# Patient Record
Sex: FEMALE | Race: WHITE | Employment: UNEMPLOYED | URBAN - METROPOLITAN AREA
[De-identification: names, ages, dates, MRNs, and addresses within clinical notes are randomized per-mention and may not be internally consistent; named-entity substitution may affect disease eponyms.]

---

## 2019-04-19 PROBLEM — F41.9 ANXIETY: Status: ACTIVE | Noted: 2019-04-19

## 2019-04-19 PROBLEM — M79.7 FIBROMYALGIA: Status: ACTIVE | Noted: 2019-04-19

## 2019-04-19 PROBLEM — R19.00 PELVIC MASS: Status: ACTIVE | Noted: 2019-04-19

## 2019-04-19 PROBLEM — M16.0 OSTEOARTHRITIS OF BOTH HIPS: Status: ACTIVE | Noted: 2019-04-19

## 2019-05-02 ENCOUNTER — ANESTHESIA (OUTPATIENT)
Dept: SURGERY | Facility: CLINIC | Age: 39
End: 2019-05-02
Payer: COMMERCIAL

## 2019-05-02 ENCOUNTER — ANESTHESIA EVENT (OUTPATIENT)
Dept: SURGERY | Facility: CLINIC | Age: 39
End: 2019-05-02
Payer: COMMERCIAL

## 2019-05-02 ENCOUNTER — HOSPITAL ENCOUNTER (OUTPATIENT)
Facility: CLINIC | Age: 39
Discharge: HOME | End: 2019-05-03
Attending: OBSTETRICS & GYNECOLOGY | Admitting: OBSTETRICS & GYNECOLOGY
Payer: COMMERCIAL

## 2019-05-02 DIAGNOSIS — R19.00 PELVIC MASS: Primary | ICD-10-CM

## 2019-05-02 PROCEDURE — 36000087 ZZH SURGERY LEVEL 8 EA 15 ADDTL MIN: Performed by: OBSTETRICS & GYNECOLOGY

## 2019-05-02 PROCEDURE — 88331 PATH CONSLTJ SURG 1 BLK 1SPC: CPT | Performed by: OBSTETRICS & GYNECOLOGY

## 2019-05-02 PROCEDURE — 25000125 ZZHC RX 250: Performed by: OBSTETRICS & GYNECOLOGY

## 2019-05-02 PROCEDURE — 27210794 ZZH OR GENERAL SUPPLY STERILE: Performed by: OBSTETRICS & GYNECOLOGY

## 2019-05-02 PROCEDURE — 37000008 ZZH ANESTHESIA TECHNICAL FEE, 1ST 30 MIN: Performed by: OBSTETRICS & GYNECOLOGY

## 2019-05-02 PROCEDURE — 25000128 H RX IP 250 OP 636: Performed by: NURSE ANESTHETIST, CERTIFIED REGISTERED

## 2019-05-02 PROCEDURE — 36000085 ZZH SURGERY LEVEL 8 1ST 30 MIN: Performed by: OBSTETRICS & GYNECOLOGY

## 2019-05-02 PROCEDURE — 37000009 ZZH ANESTHESIA TECHNICAL FEE, EACH ADDTL 15 MIN: Performed by: OBSTETRICS & GYNECOLOGY

## 2019-05-02 PROCEDURE — 88307 TISSUE EXAM BY PATHOLOGIST: CPT | Performed by: OBSTETRICS & GYNECOLOGY

## 2019-05-02 PROCEDURE — 71000013 ZZH RECOVERY PHASE 1 LEVEL 1 EA ADDTL HR: Performed by: OBSTETRICS & GYNECOLOGY

## 2019-05-02 PROCEDURE — 25000132 ZZH RX MED GY IP 250 OP 250 PS 637: Performed by: ANESTHESIOLOGY

## 2019-05-02 PROCEDURE — 25000132 ZZH RX MED GY IP 250 OP 250 PS 637: Performed by: NURSE PRACTITIONER

## 2019-05-02 PROCEDURE — 40000170 ZZH STATISTIC PRE-PROCEDURE ASSESSMENT II: Performed by: OBSTETRICS & GYNECOLOGY

## 2019-05-02 PROCEDURE — 25800030 ZZH RX IP 258 OP 636: Performed by: NURSE ANESTHETIST, CERTIFIED REGISTERED

## 2019-05-02 PROCEDURE — 71000012 ZZH RECOVERY PHASE 1 LEVEL 1 FIRST HR: Performed by: OBSTETRICS & GYNECOLOGY

## 2019-05-02 PROCEDURE — 25800025 ZZH RX 258: Performed by: OBSTETRICS & GYNECOLOGY

## 2019-05-02 PROCEDURE — 25000125 ZZHC RX 250: Performed by: NURSE ANESTHETIST, CERTIFIED REGISTERED

## 2019-05-02 PROCEDURE — 25000128 H RX IP 250 OP 636: Performed by: ANESTHESIOLOGY

## 2019-05-02 PROCEDURE — 25000566 ZZH SEVOFLURANE, EA 15 MIN: Performed by: OBSTETRICS & GYNECOLOGY

## 2019-05-02 RX ORDER — PROCHLORPERAZINE MALEATE 10 MG
10 TABLET ORAL EVERY 6 HOURS PRN
Status: DISCONTINUED | OUTPATIENT
Start: 2019-05-02 | End: 2019-05-03 | Stop reason: HOSPADM

## 2019-05-02 RX ORDER — NEOSTIGMINE METHYLSULFATE 1 MG/ML
VIAL (ML) INJECTION PRN
Status: DISCONTINUED | OUTPATIENT
Start: 2019-05-02 | End: 2019-05-02

## 2019-05-02 RX ORDER — SODIUM CHLORIDE, SODIUM LACTATE, POTASSIUM CHLORIDE, CALCIUM CHLORIDE 600; 310; 30; 20 MG/100ML; MG/100ML; MG/100ML; MG/100ML
INJECTION, SOLUTION INTRAVENOUS CONTINUOUS
Status: DISCONTINUED | OUTPATIENT
Start: 2019-05-02 | End: 2019-05-02 | Stop reason: HOSPADM

## 2019-05-02 RX ORDER — HYDROMORPHONE HYDROCHLORIDE 1 MG/ML
.3-.5 INJECTION, SOLUTION INTRAMUSCULAR; INTRAVENOUS; SUBCUTANEOUS EVERY 5 MIN PRN
Status: DISCONTINUED | OUTPATIENT
Start: 2019-05-02 | End: 2019-05-02 | Stop reason: HOSPADM

## 2019-05-02 RX ORDER — FENTANYL CITRATE 50 UG/ML
INJECTION, SOLUTION INTRAMUSCULAR; INTRAVENOUS PRN
Status: DISCONTINUED | OUTPATIENT
Start: 2019-05-02 | End: 2019-05-02

## 2019-05-02 RX ORDER — MAGNESIUM HYDROXIDE 1200 MG/15ML
LIQUID ORAL PRN
Status: DISCONTINUED | OUTPATIENT
Start: 2019-05-02 | End: 2019-05-02 | Stop reason: HOSPADM

## 2019-05-02 RX ORDER — ACETAMINOPHEN 500 MG
1000 TABLET ORAL 2 TIMES DAILY PRN
COMMUNITY

## 2019-05-02 RX ORDER — LIDOCAINE 40 MG/G
CREAM TOPICAL
Status: DISCONTINUED | OUTPATIENT
Start: 2019-05-02 | End: 2019-05-03 | Stop reason: HOSPADM

## 2019-05-02 RX ORDER — IBUPROFEN 600 MG/1
600 TABLET, FILM COATED ORAL EVERY 6 HOURS PRN
Qty: 30 TABLET | Refills: 1 | Status: SHIPPED | OUTPATIENT
Start: 2019-05-02

## 2019-05-02 RX ORDER — LABETALOL HYDROCHLORIDE 5 MG/ML
INJECTION, SOLUTION INTRAVENOUS PRN
Status: DISCONTINUED | OUTPATIENT
Start: 2019-05-02 | End: 2019-05-02

## 2019-05-02 RX ORDER — HYDROCODONE BITARTRATE AND ACETAMINOPHEN 5; 325 MG/1; MG/1
1-2 TABLET ORAL EVERY 4 HOURS PRN
Qty: 15 TABLET | Refills: 0 | Status: SHIPPED | OUTPATIENT
Start: 2019-05-02 | End: 2019-05-02

## 2019-05-02 RX ORDER — FENTANYL CITRATE 50 UG/ML
25-50 INJECTION, SOLUTION INTRAMUSCULAR; INTRAVENOUS
Status: DISCONTINUED | OUTPATIENT
Start: 2019-05-02 | End: 2019-05-02 | Stop reason: HOSPADM

## 2019-05-02 RX ORDER — ONDANSETRON 4 MG/1
4 TABLET, ORALLY DISINTEGRATING ORAL EVERY 30 MIN PRN
Status: DISCONTINUED | OUTPATIENT
Start: 2019-05-02 | End: 2019-05-02 | Stop reason: HOSPADM

## 2019-05-02 RX ORDER — ONDANSETRON 2 MG/ML
4 INJECTION INTRAMUSCULAR; INTRAVENOUS EVERY 6 HOURS PRN
Status: DISCONTINUED | OUTPATIENT
Start: 2019-05-02 | End: 2019-05-03 | Stop reason: HOSPADM

## 2019-05-02 RX ORDER — HYDROCODONE BITARTRATE AND ACETAMINOPHEN 5; 325 MG/1; MG/1
1-2 TABLET ORAL EVERY 4 HOURS PRN
Status: DISCONTINUED | OUTPATIENT
Start: 2019-05-02 | End: 2019-05-03 | Stop reason: HOSPADM

## 2019-05-02 RX ORDER — PROPOFOL 10 MG/ML
INJECTION, EMULSION INTRAVENOUS CONTINUOUS PRN
Status: DISCONTINUED | OUTPATIENT
Start: 2019-05-02 | End: 2019-05-02

## 2019-05-02 RX ORDER — HYDROMORPHONE HYDROCHLORIDE 1 MG/ML
0.2 INJECTION, SOLUTION INTRAMUSCULAR; INTRAVENOUS; SUBCUTANEOUS
Status: DISCONTINUED | OUTPATIENT
Start: 2019-05-02 | End: 2019-05-03 | Stop reason: HOSPADM

## 2019-05-02 RX ORDER — ONDANSETRON 2 MG/ML
INJECTION INTRAMUSCULAR; INTRAVENOUS PRN
Status: DISCONTINUED | OUTPATIENT
Start: 2019-05-02 | End: 2019-05-02

## 2019-05-02 RX ORDER — KETOROLAC TROMETHAMINE 30 MG/ML
INJECTION, SOLUTION INTRAMUSCULAR; INTRAVENOUS PRN
Status: DISCONTINUED | OUTPATIENT
Start: 2019-05-02 | End: 2019-05-02

## 2019-05-02 RX ORDER — ALBUTEROL SULFATE 0.83 MG/ML
2.5 SOLUTION RESPIRATORY (INHALATION) EVERY 4 HOURS PRN
Status: DISCONTINUED | OUTPATIENT
Start: 2019-05-02 | End: 2019-05-02 | Stop reason: HOSPADM

## 2019-05-02 RX ORDER — IBUPROFEN 200 MG
800 TABLET ORAL 2 TIMES DAILY PRN
Status: ON HOLD | COMMUNITY
End: 2019-05-02

## 2019-05-02 RX ORDER — ONDANSETRON 2 MG/ML
4 INJECTION INTRAMUSCULAR; INTRAVENOUS EVERY 30 MIN PRN
Status: DISCONTINUED | OUTPATIENT
Start: 2019-05-02 | End: 2019-05-02 | Stop reason: HOSPADM

## 2019-05-02 RX ORDER — GLYCOPYRROLATE 0.2 MG/ML
INJECTION, SOLUTION INTRAMUSCULAR; INTRAVENOUS PRN
Status: DISCONTINUED | OUTPATIENT
Start: 2019-05-02 | End: 2019-05-02

## 2019-05-02 RX ORDER — NALOXONE HYDROCHLORIDE 0.4 MG/ML
.1-.4 INJECTION, SOLUTION INTRAMUSCULAR; INTRAVENOUS; SUBCUTANEOUS
Status: DISCONTINUED | OUTPATIENT
Start: 2019-05-02 | End: 2019-05-03 | Stop reason: HOSPADM

## 2019-05-02 RX ORDER — NALOXONE HYDROCHLORIDE 0.4 MG/ML
.1-.4 INJECTION, SOLUTION INTRAMUSCULAR; INTRAVENOUS; SUBCUTANEOUS
Status: DISCONTINUED | OUTPATIENT
Start: 2019-05-02 | End: 2019-05-02

## 2019-05-02 RX ORDER — LIDOCAINE HYDROCHLORIDE 20 MG/ML
INJECTION, SOLUTION INFILTRATION; PERINEURAL PRN
Status: DISCONTINUED | OUTPATIENT
Start: 2019-05-02 | End: 2019-05-02

## 2019-05-02 RX ORDER — ONDANSETRON 4 MG/1
4 TABLET, ORALLY DISINTEGRATING ORAL EVERY 6 HOURS PRN
Status: DISCONTINUED | OUTPATIENT
Start: 2019-05-02 | End: 2019-05-03 | Stop reason: HOSPADM

## 2019-05-02 RX ORDER — ONDANSETRON 4 MG/1
4 TABLET, ORALLY DISINTEGRATING ORAL
Status: DISCONTINUED | OUTPATIENT
Start: 2019-05-02 | End: 2019-05-02

## 2019-05-02 RX ORDER — HYDROXYZINE HYDROCHLORIDE 25 MG/1
25 TABLET, FILM COATED ORAL ONCE
Status: COMPLETED | OUTPATIENT
Start: 2019-05-02 | End: 2019-05-02

## 2019-05-02 RX ORDER — SODIUM CHLORIDE, SODIUM LACTATE, POTASSIUM CHLORIDE, CALCIUM CHLORIDE 600; 310; 30; 20 MG/100ML; MG/100ML; MG/100ML; MG/100ML
INJECTION, SOLUTION INTRAVENOUS CONTINUOUS PRN
Status: DISCONTINUED | OUTPATIENT
Start: 2019-05-02 | End: 2019-05-02

## 2019-05-02 RX ORDER — DEXAMETHASONE SODIUM PHOSPHATE 4 MG/ML
INJECTION, SOLUTION INTRA-ARTICULAR; INTRALESIONAL; INTRAMUSCULAR; INTRAVENOUS; SOFT TISSUE PRN
Status: DISCONTINUED | OUTPATIENT
Start: 2019-05-02 | End: 2019-05-02

## 2019-05-02 RX ORDER — HYDROCODONE BITARTRATE AND ACETAMINOPHEN 5; 325 MG/1; MG/1
1-2 TABLET ORAL EVERY 4 HOURS PRN
Qty: 15 TABLET | Refills: 0 | Status: SHIPPED | OUTPATIENT
Start: 2019-05-02

## 2019-05-02 RX ORDER — HYDRALAZINE HYDROCHLORIDE 20 MG/ML
2.5-5 INJECTION INTRAMUSCULAR; INTRAVENOUS EVERY 10 MIN PRN
Status: DISCONTINUED | OUTPATIENT
Start: 2019-05-02 | End: 2019-05-02 | Stop reason: HOSPADM

## 2019-05-02 RX ORDER — HYDROCODONE BITARTRATE AND ACETAMINOPHEN 5; 325 MG/1; MG/1
1 TABLET ORAL
Status: DISCONTINUED | OUTPATIENT
Start: 2019-05-02 | End: 2019-05-02

## 2019-05-02 RX ORDER — PROPOFOL 10 MG/ML
INJECTION, EMULSION INTRAVENOUS PRN
Status: DISCONTINUED | OUTPATIENT
Start: 2019-05-02 | End: 2019-05-02

## 2019-05-02 RX ORDER — ACETAMINOPHEN 500 MG
1000 TABLET ORAL ONCE
Status: COMPLETED | OUTPATIENT
Start: 2019-05-02 | End: 2019-05-02

## 2019-05-02 RX ORDER — BUPIVACAINE HYDROCHLORIDE AND EPINEPHRINE 5; 5 MG/ML; UG/ML
INJECTION, SOLUTION PERINEURAL PRN
Status: DISCONTINUED | OUTPATIENT
Start: 2019-05-02 | End: 2019-05-02 | Stop reason: HOSPADM

## 2019-05-02 RX ORDER — SENNOSIDES A AND B 8.6 MG/1
1-3 TABLET, FILM COATED ORAL 2 TIMES DAILY PRN
Qty: 30 TABLET | Refills: 0 | Status: SHIPPED | OUTPATIENT
Start: 2019-05-02 | End: 2019-05-02

## 2019-05-02 RX ADMIN — GLYCOPYRROLATE 0.5 MG: 0.2 INJECTION, SOLUTION INTRAMUSCULAR; INTRAVENOUS at 08:27

## 2019-05-02 RX ADMIN — ACETAMINOPHEN 1000 MG: 500 TABLET, FILM COATED ORAL at 06:44

## 2019-05-02 RX ADMIN — HYDROCODONE BITARTRATE AND ACETAMINOPHEN 1 TABLET: 5; 325 TABLET ORAL at 10:59

## 2019-05-02 RX ADMIN — HYDROCODONE BITARTRATE AND ACETAMINOPHEN 1 TABLET: 5; 325 TABLET ORAL at 16:53

## 2019-05-02 RX ADMIN — FENTANYL CITRATE 100 MCG: 50 INJECTION, SOLUTION INTRAMUSCULAR; INTRAVENOUS at 07:29

## 2019-05-02 RX ADMIN — PROPOFOL 30 MCG/KG/MIN: 10 INJECTION, EMULSION INTRAVENOUS at 07:42

## 2019-05-02 RX ADMIN — Medication 0.5 MG: at 09:18

## 2019-05-02 RX ADMIN — NEOSTIGMINE METHYLSULFATE 2.5 MG: 1 INJECTION, SOLUTION INTRAVENOUS at 08:27

## 2019-05-02 RX ADMIN — MIDAZOLAM 2 MG: 1 INJECTION INTRAMUSCULAR; INTRAVENOUS at 07:25

## 2019-05-02 RX ADMIN — NEOSTIGMINE METHYLSULFATE 2.5 MG: 1 INJECTION, SOLUTION INTRAVENOUS at 08:26

## 2019-05-02 RX ADMIN — HYDROCODONE BITARTRATE AND ACETAMINOPHEN 2 TABLET: 5; 325 TABLET ORAL at 21:16

## 2019-05-02 RX ADMIN — LABETALOL HYDROCHLORIDE 20 MG: 5 INJECTION INTRAVENOUS at 08:36

## 2019-05-02 RX ADMIN — HYDROMORPHONE HYDROCHLORIDE 0.5 MG: 1 INJECTION, SOLUTION INTRAMUSCULAR; INTRAVENOUS; SUBCUTANEOUS at 07:48

## 2019-05-02 RX ADMIN — HYDROCODONE BITARTRATE AND ACETAMINOPHEN 1 TABLET: 5; 325 TABLET ORAL at 17:30

## 2019-05-02 RX ADMIN — SODIUM CHLORIDE, POTASSIUM CHLORIDE, SODIUM LACTATE AND CALCIUM CHLORIDE: 600; 310; 30; 20 INJECTION, SOLUTION INTRAVENOUS at 07:29

## 2019-05-02 RX ADMIN — ONDANSETRON 4 MG: 2 INJECTION INTRAMUSCULAR; INTRAVENOUS at 08:16

## 2019-05-02 RX ADMIN — HYDROXYZINE HYDROCHLORIDE 25 MG: 25 TABLET ORAL at 06:44

## 2019-05-02 RX ADMIN — DEXAMETHASONE SODIUM PHOSPHATE 4 MG: 4 INJECTION, SOLUTION INTRA-ARTICULAR; INTRALESIONAL; INTRAMUSCULAR; INTRAVENOUS; SOFT TISSUE at 07:40

## 2019-05-02 RX ADMIN — KETOROLAC TROMETHAMINE 30 MG: 30 INJECTION, SOLUTION INTRAMUSCULAR at 08:16

## 2019-05-02 RX ADMIN — FENTANYL CITRATE 25 MCG: 50 INJECTION, SOLUTION INTRAMUSCULAR; INTRAVENOUS at 08:24

## 2019-05-02 RX ADMIN — PROPOFOL 310 MG: 10 INJECTION, EMULSION INTRAVENOUS at 07:33

## 2019-05-02 RX ADMIN — GLYCOPYRROLATE 0.5 MG: 0.2 INJECTION, SOLUTION INTRAMUSCULAR; INTRAVENOUS at 08:26

## 2019-05-02 RX ADMIN — ROCURONIUM BROMIDE 50 MG: 10 INJECTION INTRAVENOUS at 07:33

## 2019-05-02 RX ADMIN — LIDOCAINE HYDROCHLORIDE 100 MG: 20 INJECTION, SOLUTION INFILTRATION; PERINEURAL at 07:33

## 2019-05-02 ASSESSMENT — LIFESTYLE VARIABLES: TOBACCO_USE: 1

## 2019-05-02 ASSESSMENT — MIFFLIN-ST. JEOR: SCORE: 1668.34

## 2019-05-02 ASSESSMENT — ENCOUNTER SYMPTOMS
DYSRHYTHMIAS: 0
SEIZURES: 0

## 2019-05-02 NOTE — PLAN OF CARE
Stable post op. Pain RLQ lap site controlled with 1 norco, patient states other sites not painful. Tolerated FL diet, no nausea, will advance to low fiber. Ambulated in simms wit SBA, up in chair. Voided. Plan home tomorrow.

## 2019-05-02 NOTE — OR NURSING
Capnography monitor working well and sent along with patient. Report called to [facility] on station [id number]. Patient transferred in stable condition with cane and 2 bags of personal items and one duffle bag from home. Patient rates pain at a 2 and denies nausea.

## 2019-05-02 NOTE — ANESTHESIA POSTPROCEDURE EVALUATION
Patient: Esme Pattersonscarpratima    Procedure(s):  ROBOTIC ASSISTED LAPARASCOPIC EXCISION OF RIGHT OVARIAN CYST  DAVINCI XI RIGHT SALPINGO-OOPHORECTOMY    Diagnosis:RIGHT OVARIAN CYST  Diagnosis Additional Information: No value filed.    Anesthesia Type:  General, ETT    Note:  Anesthesia Post Evaluation    Patient location during evaluation: PACU  Patient participation: Able to fully participate in evaluation  Level of consciousness: awake and alert  Pain management: adequate  Airway patency: patent  Cardiovascular status: acceptable, hemodynamically stable and blood pressure returned to baseline  Respiratory status: acceptable and nasal cannula  Hydration status: acceptable  PONV: none     Anesthetic complications: None          Last vitals:  Vitals:    05/02/19 1000 05/02/19 1043 05/02/19 1151   BP: 131/78 113/70 120/70   Pulse: 76 74 75   Resp: 16 20 16   Temp: 36.7  C (98.1  F) 36.4  C (97.6  F) 35.7  C (96.3  F)   SpO2: 97% 95% 95%         Electronically Signed By: Marilee Castillo MD  May 2, 2019  2:39 PM

## 2019-05-02 NOTE — ANESTHESIA PREPROCEDURE EVALUATION
Anesthesia Pre-Procedure Evaluation    Patient: Esme Silverman   MRN: [id number] : 1980          Preoperative Diagnosis: RIGHT OVARIAN CYST    Procedure(s):  ROBOTIC ASSISTED LAPARASCOPIC EXCISION OF RIGHT OVARIAN CYST  POSSIBLE DAVINCI XI RIGHT SALPINGO-OOPHORECTOMY    No past medical history on file.  No past surgical history on file.  Allergies   Allergen Reactions     Contrast Dye Anaphylaxis and Hives     Prior to Admission medications    Medication Sig Start Date End Date Taking? Authorizing Provider   acetaminophen (TYLENOL) 500 MG tablet Take 1,000 mg by mouth 2 times daily as needed for mild pain   Yes Reported, Patient   ibuprofen (ADVIL/MOTRIN) 200 MG tablet Take 800 mg by mouth 2 times daily as needed for mild pain   Yes Reported, Patient     RECENT LABS: K 4.1, Cr 0.7, hgb 13.3, plt 417  ECG: NSR, no st or twave abnormalities.     Anesthesia Evaluation     . Pt has had prior anesthetic. Type: General    No history of anesthetic complications          ROS/MED HX    ENT/Pulmonary:     (+)tobacco use, Past use , . .   (-) asthma and sleep apnea   Neurologic:      (-) seizures, Neuropathy and migraines   Cardiovascular:        (-) hypertension, ALY, arrhythmias, valvular problems/murmurs, dyslipidemia and stent   METS/Exercise Tolerance:  >4 METS   Hematologic:        (-) history of blood clots, anemia and other hematologic disorder   Musculoskeletal:   (+) arthritis,  -       GI/Hepatic:        (-) GERD and liver disease   Renal/Genitourinary:      (-) renal disease and nephrolithiasis   Endo:     (+) Obesity, .   (-) Type I DM, Type II DM and thyroid disease   Psychiatric:        (-) psychiatric history   Infectious Disease:        (-) Recent Fever   Malignancy:         Other: Comment: Fibromyalgia  Cannabis use                         Physical Exam  Normal systems: cardiovascular, pulmonary and dental    Airway   Mallampati: II  TM distance: >3 FB  Neck ROM: full    Dental     Cardiovascular    Rhythm and rate: regular and normal      Pulmonary    breath sounds clear to auscultation            No results found for: WBC, HGB, HCT, PLT, CRP, SED, NA, POTASSIUM, CHLORIDE, CO2, BUN, CR, GLC, KIA, PHOS, MAG, ALBUMIN, PROTTOTAL, ALT, AST, GGT, ALKPHOS, BILITOTAL, BILIDIRECT, LIPASE, AMYLASE, MARQUITA, PTT, INR, FIBR, TSH, T4, T3, HCG, HCGS, CKTOTAL, CKMB, TROPN    Preop Vitals  BP Readings from Last 3 Encounters:   No data found for BP    Pulse Readings from Last 3 Encounters:   No data found for Pulse      Resp Readings from Last 3 Encounters:   No data found for Resp    SpO2 Readings from Last 3 Encounters:   No data found for SpO2      Temp Readings from Last 1 Encounters:   No data found for Temp    Ht Readings from Last 1 Encounters:   No data found for Ht      Wt Readings from Last 1 Encounters:   No data found for Wt    There is no height or weight on file to calculate BMI.       Anesthesia Plan      History & Physical Review      ASA Status:  2 .    NPO Status:  > 8 hours    Plan for General and ETT with Propofol induction. Maintenance will be Balanced.    PONV prophylaxis:  Ondansetron (or other 5HT-3) and Dexamethasone or Solumedrol  Additional equipment: Videolaryngoscope Propofol infusion  Toradol  Hydromorphone      Postoperative Care  Postoperative pain management:  Multi-modal analgesia.      Consents  Anesthetic plan, risks, benefits and alternatives discussed with:  Patient..                 Marilee Castillo MD

## 2019-05-02 NOTE — PROGRESS NOTES
Admission medication history interview status for the 5/2/2019  admission is complete. See EPIC admission navigator for prior to admission medications     Medication history source reliability:Good    Medication history interview source(s):Patient    Medication history resources (including written lists, pill bottles, clinic record):None    [facility]    Additional medication history information not noted on PTA med list :None    Time spent in this activity: 50 minutes    Prior to Admission medications    Medication Sig Last Dose Taking? Auth Provider   acetaminophen (TYLENOL) 500 MG tablet Take 1,000 mg by mouth 2 times daily as needed for mild pain 4/18/2019 at PRN Yes Reported, Patient   ibuprofen (ADVIL/MOTRIN) 200 MG tablet Take 800 mg by mouth 2 times daily as needed for mild pain 4/17/2019 at PRN Yes Reported, Patient

## 2019-05-02 NOTE — PROCEDURES
[facility] Brief Operative Note    Pre-operative diagnosis: RIGHT OVARIAN CYST   Post-operative diagnosis Benign cysts of right ovary    Procedure: Procedure(s):  ROBOTIC ASSISTED LAPARASCOPIC EXCISION OF RIGHT OVARIAN CYST  DEVAN SCHWARTZ RIGHT SALPINGO-OOPHORECTOMY   Surgeon: DEYSI Colin CNP   Assistants(s):    Estimated blood loss: Less than 10 ml    Specimens: RSO   Findings: Benign right ovarian corpeus luteum cyst

## 2019-05-02 NOTE — OP NOTE
Procedure Date: 05/02/2019      PREOPERATIVE DIAGNOSIS:  Pelvic mass.      POSTOPERATIVE DIAGNOSIS:  Benign corpus luteal cyst.      SURGEON:  ANIRUDH Montemayor MD      ASSISTANT:  Lucy Mendoza NP      ANESTHESIA:  General endotracheal anesthesia.      PROCEDURE:  Robotic-assisted laparoscopic right salpingo-oophorectomy.        INDICATIONS FOR THE PROCEDURE:  The patient is a 39-year-old female who in 2000 underwent a laparotomy and ovarian cystectomy x 2 for exceptionally large ovarian cysts.  One weighed 10 pounds and one weighed 7 pounds, respectively, based on the patient's recollection.  In 2010, she underwent a hysterectomy for an enlarged uterus and left salpingo-oophorectomy at the same time.  She had a postoperative wound infection.  Recently, a pelvic ultrasound was obtained, 07/2018, and revealed a right-sided pelvic fluid collection, 10.2 x 5.6 x 6.3.  This continued to be present on subsequent ultrasounds, 09/20/2018 and 01/31/2019.  She was then referred to me because of pelvic pain and her prior history.  After discussions with Esme, we elected to proceed with excision of the pelvic mass, even if that included right salpingo-oophorectomy.      FINDINGS:  The patient had a large right cystic mass that looked benign.  The capsule was intact.  It did rupture intraoperatively, because of dense adherence to the pelvic peritoneum.  It was densely adherent to the ureter as well.      PROCEDURE IN DETAIL:  The patient was taken to the operating room.  Pneumatic compression stockings had been placed on her lower extremities.  She was placed in a supine position on a pink pad on the operating room table.  General endotracheal anesthesia was administered in the usual fashion.  Once intubated, she was repositioned in low lithotomy position using well-padded Yellofin stirrups.  Her arms were padded and held at her side with draw sheets.  Her hands were equally protected.  Shoulder braces were applied to her  shoulders and a Cantu was placed above her forehead.  She was prepped and draped in the usual sterile fashion.  A timeout was conducted and everyone agreed upon the procedure.  I had marked 4 supraumbilical incision sites above the umbilicus.  I started by infiltrating the one that was 8 cm left to the central port site with 0.5% Marcaine with epinephrine.  A small nick was made in the skin with a #15 scalpel blade.  A Veress needle was easily introduced into the peritoneal cavity.  Opening pressure was 5 mmHg.  The abdomen was insufflated with carbon dioxide to create a diffuse pneumoperitoneum.  Pressure limits were set and maintained at or below 15 mmHg throughout the case.  With establishment of pneumoperitoneum, the Veress needle was removed and replaced with an 8 mm da Devonte trocar.  The camera was then introduced confirming intraperitoneal position and showing no anterior abdominal wall adhesions.  Under direct visualization, 4 additional port sites were placed.  An 8 mm da Devonte port was placed in the midline at 21 cm above the symphysis pubis in the midline.  Another 8 mm da Devonte port was placed 8 cm to the right of that camera port and then a fourth da Devonte robotic port was placed 16 cm to the left of the camera port in the upper abdomen.  She was then placed in steep Trendelenburg at 28 degrees with gravitational displacement of her bowel into the upper abdomen.  An 8 mm AirSeal port was placed above the right anterior superior iliac spine.  Evaluating the pelvis, she was found to have a large 10 cm right ovarian mass.  The capsule was intact at that point.  It was densely adherent to the pelvic peritoneum laterally.  The robot was docked in the usual fashion.  Monopolar scissors were placed in the right upper robotic arm, a Maryland bipolar in the medial left upper robotic arm, and a ProGrasp in the lateral left robotic arm.  The posterior broad ligament lateral to the ovarian vessels was opened up  with the monopolar scissors.  We isolated the ovarian vessels and cauterized them with the Maryland bipolar.  They were divided with the monopolar scissors.  I then dissected out the ureter from the lateral aspect of this mass along its pelvic course.  I mobilized the mass and in doing so, we ruptured a locule of fluid.  Approximately 400-500 mL of fluid was drained.  We then elevated the rest of the mass off of the pelvic peritoneum with a combination of blunt dissection and the monopolar scissors.  Once it was completely freed up, we removed the 8 mm AirSeal and placed a 10 mm Sims EndoCatch bag through the port site.  The mass was laid within it and the bag was exteriorized through the accessory port site and removed in that fashion.  The port was then replaced and we irrigated the pelvis.  We then closed the 8 mm fascia with a fascial closure device using 0 Vicryl suture.  The robotic instruments were removed.  The robot was undocked.  The pneumoperitoneum was allowed to dissipate, and the trocars were removed intact.  Incisions were closed with 4-0 Monocryl in an interrupted fashion to reapproximate the subcutaneous tissue and 4-0 Monocryl in a subcuticular fashion to reapproximate the skin.  Mastisol was placed around the incisions.  Steri-Strips laid over the incisions.  Her anesthesia was reversed.  She was extubated and taken to recovery room in stable condition.        Estimated blood loss was less than 5 mL.  The pathology results on the frozen section were consistent with a benign corpus luteal cyst.      Lucy Mendoza was my primary assist for the case.  She helped with all aspects of the case, including trocar placement, docking of the robot, placement of the robotic instruments.  She assisted through the accessory port site and was instrumental in specimen retrieval.  She helped with undocking the robot and port site closure.         HENRIETTA MATA MD             D: 05/02/2019   T: 05/02/2019    MT: CYNDI      Name:     PAULO PAGE   MRN:      [id number]        Account:        [id number]   :      1980           Procedure Date: 2019      Document: [id number]       cc: Copy for Provider        Alma Greer MD

## 2019-05-02 NOTE — ANESTHESIA CARE TRANSFER NOTE
Patient: Esme Silverman    Procedure(s):  ROBOTIC ASSISTED LAPARASCOPIC EXCISION OF RIGHT OVARIAN CYST  DAVINCI XI RIGHT SALPINGO-OOPHORECTOMY    Diagnosis: RIGHT OVARIAN CYST  Diagnosis Additional Information: No value filed.    Anesthesia Type:   General, ETT     Note:  Airway :Face Mask  Patient transferred to:PACU  Comments: Neuromuscular blockade reversed after TOF 4/4, spontaneous respirations, adequate tidal volumes, followed commands to voice, oropharynx suctioned with soft flexible catheter, extubated atraumatically, airway patent after extubation.  Oxygen via facemask at 10 liters per minute to PACU. Oxygen tubing connected to wall O2 in PACU, SpO2, NiBP, and EKG monitors and alarms on and functioning, Ariane Hugger warmer connected to patient gown, report on patient's clinical status given to PACU RN, RN questions answered. Handoff Report: Identifed the Patient, Identified the Reponsible Provider, Reviewed the pertinent medical history, Discussed the surgical course, Reviewed Intra-OP anesthesia mangement and issues during anesthesia, Set expectations for post-procedure period and Allowed opportunity for questions and acknowledgement of understanding      Vitals: (Last set prior to Anesthesia Care Transfer)    CRNA VITALS  5/2/2019 0809 - 5/2/2019 0847      5/2/2019             NIBP:  149/91  (Abnormal)     Pulse:  97    NIBP Mean:  109    SpO2:  100 %    Resp Rate (set):  10                Electronically Signed By: DEYSI Landers CRNA  May 2, 2019  8:47 AM

## 2019-05-03 VITALS
HEART RATE: 93 BPM | SYSTOLIC BLOOD PRESSURE: 137 MMHG | HEIGHT: 64 IN | WEIGHT: 223.4 LBS | OXYGEN SATURATION: 96 % | BODY MASS INDEX: 38.14 KG/M2 | TEMPERATURE: 96.6 F | RESPIRATION RATE: 16 BRPM | DIASTOLIC BLOOD PRESSURE: 77 MMHG

## 2019-05-03 LAB — COPATH REPORT: NORMAL

## 2019-05-03 PROCEDURE — 25000132 ZZH RX MED GY IP 250 OP 250 PS 637: Performed by: NURSE PRACTITIONER

## 2019-05-03 RX ORDER — ESTRADIOL 1 MG/1
1 TABLET ORAL DAILY
Status: DISCONTINUED | OUTPATIENT
Start: 2019-05-03 | End: 2019-05-03 | Stop reason: HOSPADM

## 2019-05-03 RX ORDER — ESTRADIOL 1 MG/1
1 TABLET ORAL DAILY
Qty: 90 TABLET | Refills: 3 | Status: SHIPPED | OUTPATIENT
Start: 2019-05-03

## 2019-05-03 RX ADMIN — HYDROCODONE BITARTRATE AND ACETAMINOPHEN 2 TABLET: 5; 325 TABLET ORAL at 06:05

## 2019-05-03 RX ADMIN — HYDROCODONE BITARTRATE AND ACETAMINOPHEN 2 TABLET: 5; 325 TABLET ORAL at 01:19

## 2019-05-03 NOTE — PROGRESS NOTES
Doing well.  Incision sites fine.  OK to discharge to home.  Discharge instructions and f/u discussed.    ANIRUDH Montemayor MD  [phone]

## 2019-05-03 NOTE — PLAN OF CARE
Patient is A&Ox4. POD1 of right robotic assisted oophorectomy. C/o abdominal pain, Norco given x2 and heat packs for low back pain. Low fiber diet tolerated. Patient has 4 lap sites across abdomen. L PIV SL. Up independently to bathroom. Possible discharge to home 5/3.

## 2019-05-03 NOTE — DISCHARGE SUMMARY
"HOSPITAL DISCHARGE SUMMARY    Patient Name: Esme Silverman  YOB: 1980 Age: 39 year old  Medical Record Number: [id number]  Primary Physician: No Ref-Primary, Physician  Phone: None  Admission Date: 5/2/2019  Discharge Date: 5/3/19    Esme Silverman  will be discharged from [facility] to Home.    PRINCIPAL DISCHARGE DIAGNOSIS: Pelvid mass    BRIEF HOSPITAL COURSE: This 39 year old female admitted following robotic assisted laparoscopic rightl salpingo-oophorectomy. She tolerated the procedure well. Uneventful post operative course and discharge to home on POD #1 with adequate pain control, tolerating orals, voiding and ambulating.    PROCEDURES PERFORMED DURING HOSPITALIZATION:   Robotic assisted laparoscopic right salpingo-oophorectomy    COMPLICATIONS IN HOSPITAL: None    CONSULTATIONS:  None    PERTINENT FINDINGS/RESULTS AT DISCHARGE:   /70 (BP Location: Right arm)   Pulse 75   Temp 96.1  F (35.6  C) (Oral)   Resp 16   Ht 1.626 m (5' 4\")   Wt 101.3 kg (223 lb 6.4 oz)   SpO2 97%   Breastfeeding? No   BMI 38.35 kg/m      Latest Laboratory Results:  Chem:  CBC RESULTS: No results for input(s): WBC, RBC, HGB, HCT, MCV, MCH, MCHC, RDW, PLT in the last 38068 hours.  Last Basic Metabolic Panel:  No results found for: NA   No results found for: POTASSIUM  No results found for: CHLORIDE  No results found for: KAI  No results found for: CO2  No results found for: BUN  No results found for: CR  No results found for: GLC      IMPORTANT PENDING TEST RESULTS:  Pathology    CONDITION AT DISCHARGE:    Stabilized    DISCHARGE ORDERS  Current Discharge Medication List      START taking these medications    Details   HYDROcodone-acetaminophen (NORCO) 5-325 MG tablet Take 1-2 tablets by mouth every 4 hours as needed for pain maximum 10 tablet(s) per day  Qty: 15 tablet, Refills: 0    Associated Diagnoses: Pelvic mass         CONTINUE these medications which have CHANGED    Details   ibuprofen " (ADVIL/MOTRIN) 600 MG tablet Take 1 tablet (600 mg) by mouth every 6 hours as needed for moderate pain  Qty: 30 tablet, Refills: 1    Associated Diagnoses: Pelvic mass         CONTINUE these medications which have NOT CHANGED    Details   acetaminophen (TYLENOL) 500 MG tablet Take 1,000 mg by mouth 2 times daily as needed for mild pain             DISCHARGE INSTRUCTION.  F/u in Dr. Montemayor's office in 1 and 6 wks post op    FOLLOW-UP: Esme Silverman should see No Ref-Primary, Physician PRN.    Specialty follow-up: as above.     AFTER HOSPITAL RECOMMENDATIONS  As above.      Physician(s) in addition to primary physician who should receive a copy:  No Ref-Primary, Physician       Joya Montemayor

## 2019-05-03 NOTE — PROGRESS NOTES
Filled prescriptions given to patient. Reviewed discharge instructions, questions answered. belongings with patient for discharge. Discharging home, ride by friend, escorted out via w/c by volunteer.

## (undated) DEVICE — SU PDS II 0 CT-2 27" Z334H

## (undated) DEVICE — ESU GROUND PAD UNIVERSAL W/O CORD

## (undated) DEVICE — DAVINCI XI DRAPE COLUMN 470341

## (undated) DEVICE — DAVINCI XI SEAL UNIVERSAL 5-8MM 470361

## (undated) DEVICE — DAVINCI XI MONOPOLAR SCISSORS HOT SHEARS 8MM 470179

## (undated) DEVICE — SOL WATER IRRIG 1000ML BOTTLE 2F7114

## (undated) DEVICE — SOL NACL 0.9% INJ 1000ML BAG 2B1324X

## (undated) DEVICE — ADH LIQUID MASTISOL TOPICAL VIAL 2-3ML 0523-48

## (undated) DEVICE — SOL NACL 0.9% IRRIG 1000ML BOTTLE 2F7124

## (undated) DEVICE — SUCTION IRR STRYKERFLOW II W/TIP 250-070-520

## (undated) DEVICE — ENDO TROCAR CONMED AIRSEAL BLADELESS 08X120MM IAS8-120LP

## (undated) DEVICE — DAVINCI XI OBTURATOR BLADELESS 8MM 470359

## (undated) DEVICE — SU MONOCRYL 4-0 PS-2 18" UND Y496G

## (undated) DEVICE — SUCTION CANISTER MEDIVAC LINER 3000ML W/LID 65651-530

## (undated) DEVICE — POUCH TISSUE RETRIEVAL 15MM 6.00" INTRO TRS190SB2

## (undated) DEVICE — DAVINCI XI GRASPER ENDOWRIST PROGRASP 470093

## (undated) DEVICE — PACK DAVINCI GYN SMA15GDFS1

## (undated) DEVICE — DAVINCI XI ESU FCP BIPOLAR MARYLAND 470172

## (undated) DEVICE — SPONGE LAP 18X18" X8435

## (undated) DEVICE — SPONGE RAY-TEC 4X4" 7317

## (undated) DEVICE — SU VICRYL 0 UR-6 27" J603H

## (undated) DEVICE — GLOVE BIOGEL PI ULTRATOUCH G SZ 6.5 42165

## (undated) DEVICE — DRSG STERI STRIP 1X5" R1548

## (undated) DEVICE — GLOVE PROTEXIS MICRO 6.5  2D73PM65

## (undated) DEVICE — GLOVE PROTEXIS BLUE W/NEU-THERA 6.5  2D73EB65

## (undated) DEVICE — DRAPE CV SPLIT II 147X106" 9158

## (undated) DEVICE — DAVINCI XI DRAPE ARM 470015

## (undated) DEVICE — LINEN TOWEL PACK X5 5464

## (undated) DEVICE — KIT PATIENT POSITIONING PIGAZZI LATEX FREE 40580

## (undated) DEVICE — POUCH TISSUE RETRIEVAL LAP 10MM 2.21" INTRO TRS100SB2

## (undated) DEVICE — TUBING CONMED AIRSEAL SMOKE EVAC INSUFFLATION ASM-EVAC

## (undated) DEVICE — ENDO TROCAR OPTICAL ACCESS KII Z-THRD 15X100MM C0R37

## (undated) DEVICE — DAVINCI HOT SHEARS TIP COVER  400180

## (undated) RX ORDER — FENTANYL CITRATE 50 UG/ML
INJECTION, SOLUTION INTRAMUSCULAR; INTRAVENOUS
Status: DISPENSED
Start: 2019-05-02

## (undated) RX ORDER — ONDANSETRON 2 MG/ML
INJECTION INTRAMUSCULAR; INTRAVENOUS
Status: DISPENSED
Start: 2019-05-02

## (undated) RX ORDER — KETOROLAC TROMETHAMINE 30 MG/ML
INJECTION, SOLUTION INTRAMUSCULAR; INTRAVENOUS
Status: DISPENSED
Start: 2019-05-02

## (undated) RX ORDER — HYDROMORPHONE HYDROCHLORIDE 1 MG/ML
INJECTION, SOLUTION INTRAMUSCULAR; INTRAVENOUS; SUBCUTANEOUS
Status: DISPENSED
Start: 2019-05-02

## (undated) RX ORDER — BUPIVACAINE HYDROCHLORIDE AND EPINEPHRINE 5; 5 MG/ML; UG/ML
INJECTION, SOLUTION EPIDURAL; INTRACAUDAL; PERINEURAL
Status: DISPENSED
Start: 2019-05-02

## (undated) RX ORDER — DEXAMETHASONE SODIUM PHOSPHATE 4 MG/ML
INJECTION, SOLUTION INTRA-ARTICULAR; INTRALESIONAL; INTRAMUSCULAR; INTRAVENOUS; SOFT TISSUE
Status: DISPENSED
Start: 2019-05-02

## (undated) RX ORDER — LIDOCAINE HYDROCHLORIDE 20 MG/ML
INJECTION, SOLUTION EPIDURAL; INFILTRATION; INTRACAUDAL; PERINEURAL
Status: DISPENSED
Start: 2019-05-02

## (undated) RX ORDER — NEOSTIGMINE METHYLSULFATE 1 MG/ML
VIAL (ML) INJECTION
Status: DISPENSED
Start: 2019-05-02

## (undated) RX ORDER — HYDROXYZINE HYDROCHLORIDE 25 MG/1
TABLET, FILM COATED ORAL
Status: DISPENSED
Start: 2019-05-02

## (undated) RX ORDER — GLYCOPYRROLATE 0.2 MG/ML
INJECTION, SOLUTION INTRAMUSCULAR; INTRAVENOUS
Status: DISPENSED
Start: 2019-05-02

## (undated) RX ORDER — PROPOFOL 10 MG/ML
INJECTION, EMULSION INTRAVENOUS
Status: DISPENSED
Start: 2019-05-02

## (undated) RX ORDER — LABETALOL HYDROCHLORIDE 5 MG/ML
INJECTION, SOLUTION INTRAVENOUS
Status: DISPENSED
Start: 2019-05-02

## (undated) RX ORDER — ACETAMINOPHEN 500 MG
TABLET ORAL
Status: DISPENSED
Start: 2019-05-02